# Patient Record
Sex: MALE | Race: WHITE | NOT HISPANIC OR LATINO | Employment: OTHER | URBAN - METROPOLITAN AREA
[De-identification: names, ages, dates, MRNs, and addresses within clinical notes are randomized per-mention and may not be internally consistent; named-entity substitution may affect disease eponyms.]

---

## 2020-09-24 NOTE — PATIENT DISCUSSION
"""Patient has a hx of BRAO. Patient is followed by Dr. Buddy Epstein at [facility].  Will continue to monitor."""

## 2020-11-11 NOTE — PATIENT DISCUSSION
"""Patient has a very small nasal PFT on the right.  Patient is being followed by Dr. Mary Kay Ford for ""

## 2020-11-11 NOTE — PATIENT DISCUSSION
"""Patient has iritis on the right eye. Will increase Prednisolone Acetate to three times a day.  Will ""

## 2023-04-17 ENCOUNTER — SURGERY/PROCEDURE (OUTPATIENT)
Dept: URBAN - METROPOLITAN AREA SURGERY 14 | Facility: SURGERY | Age: 78
End: 2023-04-17

## 2023-04-17 ENCOUNTER — CONSULTATION/EVALUATION (OUTPATIENT)
Dept: URBAN - METROPOLITAN AREA CLINIC 39 | Facility: CLINIC | Age: 78
End: 2023-04-17

## 2023-04-17 DIAGNOSIS — H43.813: ICD-10-CM

## 2023-04-17 DIAGNOSIS — H26.493: ICD-10-CM

## 2023-04-17 DIAGNOSIS — H04.123: ICD-10-CM

## 2023-04-17 PROCEDURE — 6682150 YAG CAPSULOTOMY

## 2023-04-17 PROCEDURE — 99214 OFFICE O/P EST MOD 30 MIN: CPT

## 2023-04-17 ASSESSMENT — TONOMETRY
OD_IOP_MMHG: 12
OS_IOP_MMHG: 14

## 2023-04-17 ASSESSMENT — VISUAL ACUITY
OD_SC: 20/25+1
OS_SC: 20/40
OS_BAT: 20/70
OD_BAT: 20/40

## 2023-04-24 ENCOUNTER — POST-OP (OUTPATIENT)
Dept: URBAN - METROPOLITAN AREA CLINIC 43 | Facility: CLINIC | Age: 78
End: 2023-04-24

## 2023-04-24 DIAGNOSIS — Z98.890: ICD-10-CM

## 2023-04-24 PROCEDURE — 99024 POSTOP FOLLOW-UP VISIT: CPT

## 2023-04-24 ASSESSMENT — VISUAL ACUITY
OD_SC: 20/30+2
OS_SC: 20/25-2
OU_SC: J3
OS_SC: J6
OU_SC: 20/25+1
OD_SC: J4

## 2023-04-24 ASSESSMENT — TONOMETRY
OD_IOP_MMHG: 11
OS_IOP_MMHG: 11

## 2024-02-08 ENCOUNTER — COMPREHENSIVE EXAM (OUTPATIENT)
Dept: URBAN - METROPOLITAN AREA CLINIC 43 | Facility: CLINIC | Age: 79
End: 2024-02-08

## 2024-02-08 DIAGNOSIS — H04.123: ICD-10-CM

## 2024-02-08 DIAGNOSIS — H43.813: ICD-10-CM

## 2024-02-08 PROCEDURE — 92014 COMPRE OPH EXAM EST PT 1/>: CPT

## 2024-02-08 ASSESSMENT — VISUAL ACUITY
OD_SC: 20/20
OD_SC: J3
OS_SC: 20/20-1
OS_SC: J4

## 2024-02-08 ASSESSMENT — TONOMETRY
OS_IOP_MMHG: 12
OD_IOP_MMHG: 11

## 2025-02-13 ENCOUNTER — COMPREHENSIVE EXAM (OUTPATIENT)
Age: 80
End: 2025-02-13

## 2025-02-13 DIAGNOSIS — H04.123: ICD-10-CM

## 2025-02-13 DIAGNOSIS — H43.813: ICD-10-CM

## 2025-02-13 PROCEDURE — 92014 COMPRE OPH EXAM EST PT 1/>: CPT
